# Patient Record
Sex: MALE | Race: OTHER | NOT HISPANIC OR LATINO | URBAN - METROPOLITAN AREA
[De-identification: names, ages, dates, MRNs, and addresses within clinical notes are randomized per-mention and may not be internally consistent; named-entity substitution may affect disease eponyms.]

---

## 2017-04-22 ENCOUNTER — EMERGENCY (EMERGENCY)
Facility: HOSPITAL | Age: 27
LOS: 1 days | Discharge: HOME | End: 2017-04-22
Attending: EMERGENCY MEDICINE | Admitting: EMERGENCY MEDICINE
Payer: COMMERCIAL

## 2017-04-22 VITALS
HEART RATE: 86 BPM | OXYGEN SATURATION: 99 % | DIASTOLIC BLOOD PRESSURE: 84 MMHG | TEMPERATURE: 98 F | SYSTOLIC BLOOD PRESSURE: 128 MMHG | RESPIRATION RATE: 16 BRPM

## 2017-04-22 DIAGNOSIS — M54.9 DORSALGIA, UNSPECIFIED: ICD-10-CM
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
## 2017-04-22 DIAGNOSIS — M54.2 CERVICALGIA: ICD-10-CM
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
## 2017-04-22 PROCEDURE — 72070 X-RAY EXAM THORAC SPINE 2VWS: CPT | Mod: 26

## 2017-04-22 PROCEDURE — 99284 EMERGENCY DEPT VISIT MOD MDM: CPT | Mod: 25

## 2017-04-22 PROCEDURE — 99053 MED SERV 10PM-8AM 24 HR FAC: CPT

## 2017-04-22 PROCEDURE — 72125 CT NECK SPINE W/O DYE: CPT | Mod: 26

## 2017-04-22 PROCEDURE — 72100 X-RAY EXAM L-S SPINE 2/3 VWS: CPT | Mod: 26

## 2017-04-22 RX ORDER — IBUPROFEN 200 MG
600 TABLET ORAL ONCE
Qty: 0 | Refills: 0 | Status: COMPLETED | OUTPATIENT
Start: 2017-04-22 | End: 2017-04-22

## 2017-04-22 RX ADMIN — Medication 600 MILLIGRAM(S): at 01:05
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
## 2017-04-22 NOTE — ED PROVIDER NOTE - MUSCULOSKELETAL NECK EXAM
no step off, no ecchymosis, skin intact/supple/no deformity, pain or tenderness. no restriction of movement/VERTEBRAL POINT TENDERNESS/trachea midline
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
## 2017-04-22 NOTE — ED PROVIDER NOTE - DIAGNOSTIC INTERPRETATION
xray thoracic spine 2 views: no fx, no subluxations, no loss of height  xray lumbar spine 3 views:  no fx, no subluxations, no loss of height

## 2018-12-04 NOTE — ED PROVIDER NOTE - RESPIRATORY, MLM
Nursing Note by Odilia Meadows RN at 18 09:55 AM     Author:  Odilia Meadows RN Service:  (none) Author Type:  Registered Nurse     Filed:  18 09:55 AM Encounter Date:  3/1/2018 Status:  Signed     :  Odilia Meadows RN (Registered Nurse)            9:55 AM  Chief Complaint     Patient presents with     • Fever      x 2d   • Sinus Problem      sinus congestion and pressure, marcella ears plugged and fatigue x 1 wk     Patient brought to exam room.  Electronically Signed by:    Odilia Meadows RN , 3/1/2018  Patient's name,  and allergies verified with[MM1.1T] patient[MM1.1M].  Last visit with BALAJI DUGGAN was on No match found  Last visit with [facility] was on 2017 at 10:20 AM in [facility]  Match done based on reference date of today 3/1/18  Jerome Hanson was offered treatment for pain control:[MM1.1T] No.[MM1.1M]         Revision History        User Key Date/Time User Provider Type Action    > MM1.1 18 09:55 AM Odilia Meaodws RN Registered Nurse Sign    M - Manual, T - Template             Breath sounds clear and equal bilaterally.